# Patient Record
Sex: MALE | Race: WHITE | NOT HISPANIC OR LATINO | Employment: FULL TIME | URBAN - METROPOLITAN AREA
[De-identification: names, ages, dates, MRNs, and addresses within clinical notes are randomized per-mention and may not be internally consistent; named-entity substitution may affect disease eponyms.]

---

## 2018-03-15 ENCOUNTER — OFFICE VISIT (OUTPATIENT)
Dept: ORTHOPEDICS | Facility: CLINIC | Age: 48
End: 2018-03-15
Payer: MEDICAID

## 2018-03-15 VITALS
HEART RATE: 76 BPM | BODY MASS INDEX: 24.95 KG/M2 | WEIGHT: 164.13 LBS | SYSTOLIC BLOOD PRESSURE: 141 MMHG | DIASTOLIC BLOOD PRESSURE: 85 MMHG

## 2018-03-15 DIAGNOSIS — M54.12 CERVICAL RADICULOPATHY AT C7: ICD-10-CM

## 2018-03-15 DIAGNOSIS — M54.31 SCIATICA OF RIGHT SIDE: Primary | ICD-10-CM

## 2018-03-15 PROCEDURE — 99999 PR PBB SHADOW E&M-NEW PATIENT-LVL IV: CPT | Mod: PBBFAC,,, | Performed by: ORTHOPAEDIC SURGERY

## 2018-03-15 PROCEDURE — 99204 OFFICE O/P NEW MOD 45 MIN: CPT | Mod: S$PBB,,, | Performed by: ORTHOPAEDIC SURGERY

## 2018-03-15 PROCEDURE — 99204 OFFICE O/P NEW MOD 45 MIN: CPT | Mod: PBBFAC,PN | Performed by: ORTHOPAEDIC SURGERY

## 2018-03-15 RX ORDER — METHYLPREDNISOLONE 4 MG/1
TABLET ORAL
Qty: 1 PACKAGE | Refills: 0 | Status: SHIPPED | OUTPATIENT
Start: 2018-03-15 | End: 2018-04-05

## 2018-03-15 RX ORDER — MELOXICAM 15 MG/1
15 TABLET ORAL DAILY
Qty: 30 TABLET | Refills: 2 | Status: SHIPPED | OUTPATIENT
Start: 2018-03-15 | End: 2018-08-17

## 2018-03-15 RX ORDER — MELOXICAM 15 MG/1
TABLET ORAL
COMMUNITY
Start: 2018-02-20 | End: 2018-08-17

## 2018-03-29 NOTE — PROGRESS NOTES
Subjective:      Patient ID: David Wilson is a 47 y.o. male.    Chief Complaint: Consult (right siude body pain)    Pt presents with significant bilateral lower extremity radiculopathy.          Review of Systems   Constitution: Negative for chills and fever.   Cardiovascular: Negative for chest pain and syncope.   Respiratory: Negative for cough and shortness of breath.    Musculoskeletal: Positive for back pain, muscle weakness and neck pain.   Gastrointestinal: Negative for nausea and vomiting.   Neurological: Positive for paresthesias. Negative for brief paralysis and seizures.   Psychiatric/Behavioral: Negative for altered mental status and hallucinations.         Objective:            General    Constitutional: He is oriented to person, place, and time. He appears well-developed and well-nourished.   HENT:   Head: Normocephalic and atraumatic.   Eyes: Conjunctivae are normal.   Neck: Normal range of motion.   Cardiovascular: Intact distal pulses.    Pulmonary/Chest: Effort normal.   Neurological: He is alert and oriented to person, place, and time.   Psychiatric: He has a normal mood and affect. His behavior is normal. Judgment and thought content normal.             Awake/alert/oriented x3, No acute distress, Afebrile, Vital signs stable  Normocephalic, Atraumatic  Heart is beating at normal rate  Good inspiratory effort with unlaboured breathing  Abdomen soft/nondistended/nontender    BILATERAL lower extremity  Motor intact L2-S1  Sensation intact L2-S2  2+ popliteal/dorsalis pedis/posterior tibial pulses  <2s CR refill to digits                Assessment:       Encounter Diagnoses   Name Primary?    Sciatica of right side Yes    Cervical radiculopathy at C7           Plan:       David was seen today for consult.    Diagnoses and all orders for this visit:    Sciatica of right side  -     methylPREDNISolone (MEDROL DOSEPACK) 4 mg tablet; use as directed  -     meloxicam (MOBIC) 15 MG tablet; Take 1  tablet (15 mg total) by mouth once daily. Do not take with any other NSAIDs  Take with a meal  -     Ambulatory Referral to Pain Clinic  -     MRI Cervical Spine Without Contrast; Future  -     MRI Lumbar Spine Without Contrast; Future    Cervical radiculopathy at C7  -     Ambulatory Referral to Pain Clinic  -     MRI Cervical Spine Without Contrast; Future  -     MRI Lumbar Spine Without Contrast; Future      48yo M with BILATERAL lower extremity radiculopathy    MRI spine  Pain referral  RTC prn

## 2018-04-12 ENCOUNTER — OFFICE VISIT (OUTPATIENT)
Dept: ORTHOPEDICS | Facility: CLINIC | Age: 48
End: 2018-04-12
Payer: MEDICAID

## 2018-04-12 VITALS
WEIGHT: 166.19 LBS | BODY MASS INDEX: 25.19 KG/M2 | SYSTOLIC BLOOD PRESSURE: 145 MMHG | DIASTOLIC BLOOD PRESSURE: 99 MMHG | HEART RATE: 73 BPM | HEIGHT: 68 IN

## 2018-04-12 DIAGNOSIS — M54.16 LUMBAR RADICULOPATHY, ACUTE: Primary | ICD-10-CM

## 2018-04-12 PROCEDURE — 99213 OFFICE O/P EST LOW 20 MIN: CPT | Mod: PBBFAC,PN | Performed by: ORTHOPAEDIC SURGERY

## 2018-04-12 PROCEDURE — 99999 PR PBB SHADOW E&M-EST. PATIENT-LVL III: CPT | Mod: PBBFAC,,, | Performed by: ORTHOPAEDIC SURGERY

## 2018-04-12 PROCEDURE — 99213 OFFICE O/P EST LOW 20 MIN: CPT | Mod: S$PBB,,, | Performed by: ORTHOPAEDIC SURGERY

## 2018-04-12 NOTE — PROGRESS NOTES
Subjective:      Patient ID: David Wilson is a 47 y.o. male.    Chief Complaint: Pain of the Spine    Pt presents with significant bilateral lower extremity radiculopathy.    Returns for MRI review      Pain   Associated symptoms include neck pain. Pertinent negatives include no chest pain, chills, coughing, fever, nausea or vomiting.       Review of Systems   Constitution: Negative for chills and fever.   Cardiovascular: Negative for chest pain and syncope.   Respiratory: Negative for cough and shortness of breath.    Musculoskeletal: Positive for back pain, muscle weakness and neck pain.   Gastrointestinal: Negative for nausea and vomiting.   Neurological: Positive for paresthesias. Negative for brief paralysis and seizures.   Psychiatric/Behavioral: Negative for altered mental status and hallucinations.         Objective:            General    Constitutional: He is oriented to person, place, and time. He appears well-developed and well-nourished.   HENT:   Head: Normocephalic and atraumatic.   Eyes: Conjunctivae are normal.   Neck: Normal range of motion.   Cardiovascular: Intact distal pulses.    Pulmonary/Chest: Effort normal.   Neurological: He is alert and oriented to person, place, and time.   Psychiatric: He has a normal mood and affect. His behavior is normal. Judgment and thought content normal.             Awake/alert/oriented x3, No acute distress, Afebrile, Vital signs stable  Normocephalic, Atraumatic  Heart is beating at normal rate  Good inspiratory effort with unlaboured breathing  Abdomen soft/nondistended/nontender    BILATERAL lower extremity  Motor intact L2-S1  Sensation intact L2-S2  2+ popliteal/dorsalis pedis/posterior tibial pulses  <2s CR refill to digits    Narrative     EXAMINATION:  MRI LUMBAR SPINE WITHOUT CONTRAST    CLINICAL HISTORY:  sciatica; Sciatica, right side    TECHNIQUE:  Multiplanar, multisequence MR images were acquired from the thoracolumbar junction to the sacrum  without the administration of contrast.    COMPARISON:  None.    FINDINGS:  Normal lumbar alignment allowing for slight anterolisthesis of L4.  The lumbar and visualized T11, T12 vertebral bodies are of normal height and, other than as noted at the lumbosacral junction (see below), normal signal intensity.  Incidentally noted tiny nodular island of yellow or fatty marrow within the of left paracentral aspect of the L1 vertebral body.    L5-S1 level: Symmetric diffuse circumferential bulge of a substantially narrowed dehydrated disc predominant anteriorly with contiguous hypertrophic spurring.  Related broad-based bands of extensive degenerative type subchondral marrow space signal alteration of predominant Modic type 2 (fatty marrow conversion).  In combination with mild-moderate facet joint arthropathy, findings contributory to severe range bilateral foraminal stenosis encroaching upon the traversing L5 nerve roots.  Comparatively mild central canal narrowing.    L4-5 level: Mild-moderate disc dehydration with symmetric diffuse circumferential annular bulge.  Moderately advanced hypertrophic facet joint arthropathy with slight anterolisthesis of L4.  In combination with thickening or hypertrophy of the ligamentum flavum if not also component of posterior epidural lipomatosis, findings contributory to moderate to severe range stenosis of the thecal sac/subarachnoid space.  See T2 axial images 12-15 of series 10.  Moderate foraminal stenosis partially effacing epidural fat surrounding the traversing L4 nerve roots, slightly more prominent on the left.  CT 2 axial images 11-15 of series 10.    L2-3, L3-4 levels: Normally hydrated and configured discs allowing for slight symmetric circumferential annular bulges.  Mild-moderate facet joint arthropathy.  Mild foraminal narrowing.    L1-2 level: Normally hydrated and configured disc allowing for slight circumferential annular bulge.  No significant central canal or  foraminal narrowing.  Tip of the conus medullaris normally positioned at upper L2 vertebral body level.    T12-L1 level: No remarkable findings.                 Assessment:       Encounter Diagnosis   Name Primary?    Lumbar radiculopathy, acute Yes          Plan:       David was seen today for pain.    Diagnoses and all orders for this visit:    Lumbar radiculopathy, acute      46yo M with BILATERAL lower extremity radiculopathy      Pain referral  RTC prn

## 2018-07-26 ENCOUNTER — TELEPHONE (OUTPATIENT)
Dept: ORTHOPEDICS | Facility: CLINIC | Age: 48
End: 2018-07-26

## 2021-12-16 PROBLEM — I10 ESSENTIAL HYPERTENSION: Status: ACTIVE | Noted: 2021-12-16

## 2021-12-16 PROBLEM — M51.9 LUMBAR DISC DISEASE: Status: ACTIVE | Noted: 2021-12-16

## 2021-12-16 PROBLEM — M50.90 CERVICAL DISC DISEASE: Status: ACTIVE | Noted: 2021-12-16

## 2021-12-16 PROBLEM — F34.1 DYSTHYMIA: Status: ACTIVE | Noted: 2021-12-16

## 2021-12-16 PROBLEM — E78.2 MIXED HYPERLIPIDEMIA: Status: ACTIVE | Noted: 2021-12-16

## 2021-12-16 PROBLEM — J01.00 SUBACUTE MAXILLARY SINUSITIS: Status: ACTIVE | Noted: 2021-12-16

## 2021-12-16 PROBLEM — R39.11 BENIGN PROSTATIC HYPERPLASIA WITH URINARY HESITANCY: Status: ACTIVE | Noted: 2021-12-16

## 2021-12-16 PROBLEM — N40.1 BENIGN PROSTATIC HYPERPLASIA WITH URINARY HESITANCY: Status: ACTIVE | Noted: 2021-12-16

## 2022-01-19 PROBLEM — F40.10 SOCIAL ANXIETY DISORDER: Status: ACTIVE | Noted: 2022-01-19

## 2022-03-21 PROBLEM — J01.00 SUBACUTE MAXILLARY SINUSITIS: Status: RESOLVED | Noted: 2021-12-16 | Resolved: 2022-03-21

## 2023-04-03 PROBLEM — Z12.11 COLON CANCER SCREENING: Status: ACTIVE | Noted: 2023-04-03

## 2023-05-01 NOTE — PROGRESS NOTES
"Subjective:      David Wilson is a 53 y.o. male who presents for evaluation of hematuria/BPH.      Benign Prostatic Hypertrophy  Patient complains of lower urinary tract symptoms. Patient states symptoms are of moderate severity. Onset of symptoms was several years ago and was unknown in onset. His AUA Symptom Score is, 12/3 manifested as irritative symptoms including frequency, urgency, nocturia and obstructive symptoms including incomplete emptying, weak stream, straining. He has no personal history and no family history of prostate cancer. He reports a history of  microscopic hematuria; workup with Dr. Marrufo over 11 years ago . He denies flank pain, gross hematuria, kidney stones, and recurrent UTI.  Currently on Flomax for the past 2-3 years.    The following portions of the patient's history were reviewed and updated as appropriate: allergies, current medications, past family history, past medical history, past social history, past surgical history and problem list.    Review of Systems  Constitutional: no fever or chills  ENT: no nasal congestion or sore throat  Respiratory: no cough or shortness of breath  Cardiovascular: no chest pain or palpitations  Gastrointestinal: no nausea or vomiting, tolerating diet  Genitourinary: as per HPI  Hematologic/Lymphatic: no easy bruising or lymphadenopathy  Musculoskeletal: no arthralgias or myalgias  Neurological: no seizures or tremors  Behavioral/Psych: no auditory or visual hallucinations     Objective:   Vitals: /64 (BP Location: Right arm, Patient Position: Sitting, BP Method: Small (Automatic))   Pulse 64   Ht 5' 8" (1.727 m)   Wt 70 kg (154 lb 3.4 oz)   BMI 23.45 kg/m²     Physical Exam   General: alert and oriented, no acute distress  Head: normocephalic, atraumatic  Neck: supple, no lymphadenopathy, normal ROM, no masses  Respiratory: Symmetric expansion, non-labored breathing  Cardiovascular: regular rate and rhythm, nomal pulses, no peripheral " edema  Abdomen: soft, non tender, non distended  Genitourinary: defer   Skin: normal coloration and turgor, no rashes, no suspicious skin lesions noted  Neuro: alert and oriented x3, no gross deficits  Psych: normal judgment and insight, normal mood/affect, and non-anxious    Physical Exam    Lab Review   Urinalysis demonstrates positive for red blood cells (+++)  Lab Results   Component Value Date    WBC 8.94 04/06/2023    HGB 11.5 (L) 04/06/2023    HCT 33.8 (L) 04/06/2023    MCV 90 04/06/2023     04/06/2023     Lab Results   Component Value Date    CREATININE 1.4 04/06/2023    BUN 17 04/06/2023     Lab Results   Component Value Date    PSA 0.81 12/20/2021     CT RENAL STONE STUDY ABD PELVIS WO     CLINICAL HISTORY:  Flank pain, kidney stone suspected;     TECHNIQUE:  Multiplanar images were obtained of the abdomen and pelvis from the hemidiaphragms through the symphysis pubis without intravenous contrast.     COMPARISON:  CT abdomen and pelvis from 08/01/2017.     FINDINGS:  Lung Bases: Clear.     Heart: Heart size is normal.  No pericardial effusion.     Distal esophagus: Again seen are several stable nodular densities adjacent to the distal esophagus and gastroesophageal junction which may represent lymph nodes or small esophageal dislocations cysts, stable.     Liver: Normal in size and attenuation without focal hepatic lesion.     Biliary tract: No intrahepatic or extrahepatic biliary ductal dilatation.     Gallbladder: No radiodense gallstone. No wall thickening or pericholecystic fluid.     Pancreas: Normal. No pancreatic ductal dilatation.     Spleen: Normal size without focal lesion.     Adrenals: Normal.     Kidneys and urinary collecting systems: Normal.  No hydronephrosis or urolithiasis.     Lymph nodes: None enlarged.     Stomach and bowel: The stomach is normal.  Loops of small and large bowel are normal in caliber without evidence for inflammation or obstruction.  There is a large amount of  stool, correlate for constipation.  The appendix is normal.     Peritoneum and mesentery: No ascites or free intraperitoneal air. No abdominal fluid collection.     Vasculature: Normal.     Urinary bladder: Normal.     Reproductive organs: The prostate is mildly enlarged.     Body wall: Tiny fat containing umbilical hernia noted.     Musculoskeletal: No aggressive osseous lesion.     Impression:     Large amount of stool, correlate with constipation.     Stable nonspecific nodularity adjacent to the distal esophagus and gastroesophageal junction.        Electronically signed by: Alphonse Santiago  Date:                                            04/06/2023  Time:                                           21:22    Bladder Scan PVR: 70 cc     Assessment and Plan:   1. Asymptomatic microscopic hematuria  --Recent CT RSS negative for stones, hydro, masses  --UA and UC today  --Cystoscope NA     2. Benign prostatic hyperplasia with urgency  --patient with continued urgency and frequency despite Flomax for several years.  We discussed finasteride which patient declined.  Will trial OAB medication.    --will notify with results  --RTC after cystoscope     This note is dictated on M*Modal word recognition program.  There are word recognition mistakes that are occasionally missed on review.

## 2023-05-03 ENCOUNTER — OFFICE VISIT (OUTPATIENT)
Dept: UROLOGY | Facility: CLINIC | Age: 53
End: 2023-05-03
Payer: MEDICAID

## 2023-05-03 ENCOUNTER — TELEPHONE (OUTPATIENT)
Dept: UROLOGY | Facility: CLINIC | Age: 53
End: 2023-05-03

## 2023-05-03 VITALS
HEART RATE: 64 BPM | DIASTOLIC BLOOD PRESSURE: 64 MMHG | SYSTOLIC BLOOD PRESSURE: 106 MMHG | WEIGHT: 154.19 LBS | BODY MASS INDEX: 23.37 KG/M2 | HEIGHT: 68 IN

## 2023-05-03 DIAGNOSIS — R39.11 BENIGN PROSTATIC HYPERPLASIA WITH URINARY HESITANCY: ICD-10-CM

## 2023-05-03 DIAGNOSIS — R31.21 ASYMPTOMATIC MICROSCOPIC HEMATURIA: ICD-10-CM

## 2023-05-03 DIAGNOSIS — N40.1 BENIGN PROSTATIC HYPERPLASIA WITH URINARY HESITANCY: ICD-10-CM

## 2023-05-03 LAB
BILIRUB SERPL-MCNC: NEGATIVE MG/DL
BLOOD URINE, POC: ABNORMAL
CLARITY, POC UA: CLEAR
COLOR, POC UA: YELLOW
GLUCOSE UR QL STRIP: NEGATIVE
KETONES UR QL STRIP: NEGATIVE
LEUKOCYTE ESTERASE URINE, POC: NEGATIVE
MICROSCOPIC COMMENT: ABNORMAL
NITRITE, POC UA: NEGATIVE
PH, POC UA: 6
POC RESIDUAL URINE VOLUME: 70 ML (ref 0–100)
PROTEIN, POC: ABNORMAL
RBC #/AREA URNS HPF: 16 /HPF (ref 0–4)
SPECIFIC GRAVITY, POC UA: 1.01
UROBILINOGEN, POC UA: NORMAL
WBC #/AREA URNS HPF: 0 /HPF (ref 0–5)

## 2023-05-03 PROCEDURE — 99204 OFFICE O/P NEW MOD 45 MIN: CPT | Mod: S$PBB,,, | Performed by: NURSE PRACTITIONER

## 2023-05-03 PROCEDURE — 51798 US URINE CAPACITY MEASURE: CPT | Mod: PBBFAC,PN | Performed by: NURSE PRACTITIONER

## 2023-05-03 PROCEDURE — 1159F PR MEDICATION LIST DOCUMENTED IN MEDICAL RECORD: ICD-10-PCS | Mod: CPTII,,, | Performed by: NURSE PRACTITIONER

## 2023-05-03 PROCEDURE — 3078F PR MOST RECENT DIASTOLIC BLOOD PRESSURE < 80 MM HG: ICD-10-PCS | Mod: CPTII,,, | Performed by: NURSE PRACTITIONER

## 2023-05-03 PROCEDURE — 3074F PR MOST RECENT SYSTOLIC BLOOD PRESSURE < 130 MM HG: ICD-10-PCS | Mod: CPTII,,, | Performed by: NURSE PRACTITIONER

## 2023-05-03 PROCEDURE — 3008F PR BODY MASS INDEX (BMI) DOCUMENTED: ICD-10-PCS | Mod: CPTII,,, | Performed by: NURSE PRACTITIONER

## 2023-05-03 PROCEDURE — 3078F DIAST BP <80 MM HG: CPT | Mod: CPTII,,, | Performed by: NURSE PRACTITIONER

## 2023-05-03 PROCEDURE — 81002 URINALYSIS NONAUTO W/O SCOPE: CPT | Mod: PBBFAC,PN | Performed by: NURSE PRACTITIONER

## 2023-05-03 PROCEDURE — 99999 PR PBB SHADOW E&M-EST. PATIENT-LVL IV: CPT | Mod: PBBFAC,,, | Performed by: NURSE PRACTITIONER

## 2023-05-03 PROCEDURE — 1159F MED LIST DOCD IN RCRD: CPT | Mod: CPTII,,, | Performed by: NURSE PRACTITIONER

## 2023-05-03 PROCEDURE — 3008F BODY MASS INDEX DOCD: CPT | Mod: CPTII,,, | Performed by: NURSE PRACTITIONER

## 2023-05-03 PROCEDURE — 87086 URINE CULTURE/COLONY COUNT: CPT | Performed by: NURSE PRACTITIONER

## 2023-05-03 PROCEDURE — 99214 OFFICE O/P EST MOD 30 MIN: CPT | Mod: PBBFAC,PN | Performed by: NURSE PRACTITIONER

## 2023-05-03 PROCEDURE — 99204 PR OFFICE/OUTPT VISIT, NEW, LEVL IV, 45-59 MIN: ICD-10-PCS | Mod: S$PBB,,, | Performed by: NURSE PRACTITIONER

## 2023-05-03 PROCEDURE — 1160F RVW MEDS BY RX/DR IN RCRD: CPT | Mod: CPTII,,, | Performed by: NURSE PRACTITIONER

## 2023-05-03 PROCEDURE — 3074F SYST BP LT 130 MM HG: CPT | Mod: CPTII,,, | Performed by: NURSE PRACTITIONER

## 2023-05-03 PROCEDURE — 99999 PR PBB SHADOW E&M-EST. PATIENT-LVL IV: ICD-10-PCS | Mod: PBBFAC,,, | Performed by: NURSE PRACTITIONER

## 2023-05-03 PROCEDURE — 1160F PR REVIEW ALL MEDS BY PRESCRIBER/CLIN PHARMACIST DOCUMENTED: ICD-10-PCS | Mod: CPTII,,, | Performed by: NURSE PRACTITIONER

## 2023-05-03 RX ORDER — SOLIFENACIN SUCCINATE 10 MG/1
10 TABLET, FILM COATED ORAL DAILY
Qty: 30 TABLET | Refills: 11 | Status: SHIPPED | OUTPATIENT
Start: 2023-05-03 | End: 2024-05-02

## 2023-05-03 NOTE — TELEPHONE ENCOUNTER
Was calling pt to confirm his appointment, before I can return pt call he was already in office for his appointment scheduled with bertha.

## 2023-05-03 NOTE — TELEPHONE ENCOUNTER
----- Message from Cat Solo sent at 5/3/2023  9:35 AM CDT -----  Regarding: call back  Contact: [phone]  Pt returning call didn't leave a  vm please call to discuss further

## 2023-05-04 LAB — BACTERIA UR CULT: NO GROWTH

## 2023-08-01 PROBLEM — R51.9 NEW ONSET OF HEADACHES: Status: ACTIVE | Noted: 2023-08-01

## 2023-09-18 ENCOUNTER — PATIENT MESSAGE (OUTPATIENT)
Dept: PEDIATRICS | Facility: CLINIC | Age: 53
End: 2023-09-18
Payer: MEDICAID

## 2023-10-17 ENCOUNTER — PATIENT MESSAGE (OUTPATIENT)
Dept: PODIATRY | Facility: CLINIC | Age: 53
End: 2023-10-17
Payer: MEDICAID

## 2023-11-15 ENCOUNTER — CLINICAL SUPPORT (OUTPATIENT)
Dept: SMOKING CESSATION | Facility: CLINIC | Age: 53
End: 2023-11-15

## 2023-11-15 DIAGNOSIS — F17.200 NICOTINE DEPENDENCE: Primary | ICD-10-CM

## 2023-11-15 PROCEDURE — 99404 PR PREVENT COUNSEL,INDIV,60 MIN: ICD-10-PCS | Mod: S$GLB,,,

## 2023-11-15 PROCEDURE — 99404 PREV MED CNSL INDIV APPRX 60: CPT | Mod: S$GLB,,,

## 2023-11-15 RX ORDER — IBUPROFEN 200 MG
1 TABLET ORAL DAILY
Qty: 28 PATCH | Refills: 0 | Status: SHIPPED | OUTPATIENT
Start: 2023-11-15

## 2023-11-15 RX ORDER — VARENICLINE TARTRATE 1 MG/1
1 TABLET, FILM COATED ORAL 2 TIMES DAILY
Qty: 60 TABLET | Refills: 0 | Status: SHIPPED | OUTPATIENT
Start: 2023-11-15

## 2023-11-15 NOTE — PROGRESS NOTES
See Tobacco Cessation Intake Form for patient assessment and recommendations.  Exhaled carbon monoxide level was 17 ppm per [employer].

## 2023-11-15 NOTE — Clinical Note
Pt seen at intake today. He currently smokes 30 cigs/day. Discussed tobacco cessation medication of chantix 1 mg QAM to start and 21 mg nicotine patch QD. Pt started on rate reduction and wait time of 15 min prior to smoking. Exhaled carbon monoxide level was 17 (0-6 non-smoker). Will see pt back in office in 2 wks.

## 2023-11-29 ENCOUNTER — CLINICAL SUPPORT (OUTPATIENT)
Dept: SMOKING CESSATION | Facility: CLINIC | Age: 53
End: 2023-11-29

## 2023-11-29 DIAGNOSIS — F17.200 NICOTINE DEPENDENCE: Primary | ICD-10-CM

## 2023-11-29 PROCEDURE — 99403 PREV MED CNSL INDIV APPRX 45: CPT | Mod: S$GLB,,,

## 2023-11-29 PROCEDURE — 99403 PR PREVENT COUNSEL,INDIV,45 MIN: ICD-10-PCS | Mod: S$GLB,,,

## 2023-11-29 NOTE — Clinical Note
Pt seen in office today. He continues to smoke 20 cigs/day. Pt remains on tobacco cessation medication of chantix 1 mg QD and 21 mg nicotine patch QD. He states that using patch makes him a little nauseous. Asked him to try to cut a patch in half. Will monitor. No adverse effects/mental changes noted at this time. Pt asked to reduce current smoking rate by 2 cigs/day and continue to try to make times between smokes as long as possible. Reviewed coping strategies/stress management/habitual behavior with patient. Exhaled carbon monoxide level was 14 ppm per [employer] (0-6 non-smoker). Will see pt back in office in 1 wk.

## 2023-11-29 NOTE — PROGRESS NOTES
Individual Follow-Up Form    11/29/2023    Clinical Status of Patient: Outpatient    Length of Service: 45 minutes    Continuing Medication: yes  Chantix or Patches    Other Medications: none     Target Symptoms: Withdrawal and medication side effects. The following were rated moderate (3) to severe (4) on TCRS:  Moderate (3): desire/crave tobacco, nausea  Severe (4): none    Comments:  Pt seen in office today. He continues to smoke 20 cigs/day. Pt remains on tobacco cessation medication of chantix 1 mg QD and 21 mg nicotine patch QD. He states that using patch makes him a little nauseous. Asked him to try to cut a patch in half. Will monitor. No adverse effects/mental changes noted at this time. Pt asked to reduce current smoking rate by 2 cigs/day and continue to try to make times between smokes as long as possible. Reviewed coping strategies/stress management/habitual behavior with patient. Exhaled carbon monoxide level was 14 ppm per [employer]oker[employer] (0-6 non-smoker). Will see pt back in office in 1 wk.     Diagnosis: F17.200    Next Visit: 1 week

## 2024-02-01 ENCOUNTER — TELEPHONE (OUTPATIENT)
Dept: SMOKING CESSATION | Facility: CLINIC | Age: 54
End: 2024-02-01
Payer: MEDICAID

## 2024-02-28 ENCOUNTER — TELEPHONE (OUTPATIENT)
Dept: SMOKING CESSATION | Facility: CLINIC | Age: 54
End: 2024-02-28
Payer: MEDICAID

## 2024-04-22 ENCOUNTER — PATIENT MESSAGE (OUTPATIENT)
Dept: UROLOGY | Facility: CLINIC | Age: 54
End: 2024-04-22
Payer: MEDICAID

## 2024-05-20 ENCOUNTER — TELEPHONE (OUTPATIENT)
Dept: SMOKING CESSATION | Facility: CLINIC | Age: 54
End: 2024-05-20
Payer: MEDICAID

## 2024-11-29 ENCOUNTER — TELEPHONE (OUTPATIENT)
Dept: SMOKING CESSATION | Facility: CLINIC | Age: 54
End: 2024-11-29
Payer: MEDICAID

## 2025-02-13 DIAGNOSIS — F17.200 NICOTINE DEPENDENCE: Primary | ICD-10-CM

## 2025-05-26 NOTE — TELEPHONE ENCOUNTER
----- Message from Sue Luu sent at 7/26/2018  2:52 PM CDT -----  Contact: David  He has a referral to epidural injection. Please call him back with an appointment [phone] (home)   Thanks!  
New contact lenses were given; ok to call and order
Patient wanted to see Dr Butcher for possible spinal epidural. Advised patient that Dr Butcher raya not do this. Advised patient to try [facility] and [facility].  
- - -

## 2025-06-25 DIAGNOSIS — M54.16 LUMBAR RADICULOPATHY: ICD-10-CM

## 2025-06-25 DIAGNOSIS — Z98.1 HISTORY OF FUSION OF CERVICAL SPINE: ICD-10-CM

## 2025-06-25 DIAGNOSIS — M54.12 CERVICAL RADICULOPATHY: Primary | ICD-10-CM

## 2025-07-31 ENCOUNTER — TELEPHONE (OUTPATIENT)
Dept: PULMONOLOGY | Facility: CLINIC | Age: 55
End: 2025-07-31
Payer: MEDICAID

## 2025-07-31 DIAGNOSIS — R91.1 SOLITARY PULMONARY NODULE: Primary | ICD-10-CM

## 2025-07-31 NOTE — TELEPHONE ENCOUNTER
Called and spoke with pt to confirm scheduling of follow up LDCT scan.  Agreeable with scheduling on August 22nd instructions given on where to go.

## 2025-08-26 ENCOUNTER — TELEPHONE (OUTPATIENT)
Dept: PULMONOLOGY | Facility: CLINIC | Age: 55
End: 2025-08-26
Payer: MEDICAID